# Patient Record
Sex: FEMALE | Race: WHITE | URBAN - METROPOLITAN AREA
[De-identification: names, ages, dates, MRNs, and addresses within clinical notes are randomized per-mention and may not be internally consistent; named-entity substitution may affect disease eponyms.]

---

## 2020-05-01 ENCOUNTER — OFFICE VISIT (OUTPATIENT)
Dept: URBAN - METROPOLITAN AREA CLINIC 45 | Facility: CLINIC | Age: 60
End: 2020-05-01
Payer: COMMERCIAL

## 2020-05-01 DIAGNOSIS — H16.141 PUNCTATE KERATITIS, RIGHT EYE: Primary | ICD-10-CM

## 2020-05-01 PROCEDURE — 92002 INTRM OPH EXAM NEW PATIENT: CPT | Performed by: OPTOMETRIST

## 2020-05-01 RX ORDER — LEVOTHYROXINE SODIUM 137 UG/1
TABLET ORAL
Qty: 0 | Refills: 0 | Status: ACTIVE
Start: 2020-05-01

## 2020-05-01 ASSESSMENT — INTRAOCULAR PRESSURE
OS: 19
OD: 19

## 2020-05-01 NOTE — IMPRESSION/PLAN
Impression: Punctate keratitis, right eye: H16.141.  Plan: preservative free artificial tears q 1 hr od, cold comps